# Patient Record
(demographics unavailable — no encounter records)

---

## 2025-01-06 NOTE — IMAGING
[de-identified] : LEFT SHOULDER Inspection: No swelling.  Palpation: Tenderness is noted at the bicipital groove, anterior and lateral.  Range of motion: There is pain with range of motion. , ER 55, @90ER 90, @90IR 30 Strength: There is pain, weakness, and discomfort with strength testing. Forward Flexion 3/5. Abduction 3/5. External Rotation 4/5 and Internal Rotation 5-/5  Neurological testings: motor and sensor intact distally. Ligament Stability and Special Tests:  There is positive arc of pain.  Shoulder apprehension: neg Shoulder relocation: neg Obriens test: pos Biceps Active test: neg Wren Labral Shear: neg Impingement testing: pos Sherwin testing: pos Whipple: pos Cross Body Adduction: neg

## 2025-01-06 NOTE — DISCUSSION/SUMMARY
[de-identified] : Assessment:   The patient is a 47 year old male with physical exam findings consistent with LEFT SHOULDER IMPINGEMENT, BICEPS TENDONITIS, RTC SYNDROME   - We discussed their diagnosis and treatment options at length including the risks and benefits of both surgical and non-surgical options. - We will continue conservative treatment with PT, HEP, icing, and OTC anti-inflammatory medication. - The patient was advised to let pain guide the gradual advancement of activities. - Patient is continuing to have worsened pain and weakness. MRI referral eval for RTC tear.  Follow up: with MRI

## 2025-01-06 NOTE — HISTORY OF PRESENT ILLNESS
[de-identified] : 01/06/2025: Pt here today for follow-up for left shoulder pain. Pain and symptoms are the same. Since last visit pt has been doing at home physical therapy with some improvement.   09/30/2024: The patient is a 47 year old M, RHD who presents today complaining of left shoulder. Date of Injury/Onset: Beginning of August 2024 Pain:    At Rest:  4/10 With Activity:   7-8/10 Mechanism of injury: Patient reports carrying furniture from his house and felt a strain.  Quality of symptoms: Aching and sharp  Improves with: Motrin Worse with:  Lifiting Prior treatment/ None School/Sport/Position/Occupation:   Additional Information: [None]

## 2025-01-27 NOTE — DATA REVIEWED
[MRI] : MRI [Left] : left [Shoulder] : shoulder [Report was reviewed and noted in the chart] : The report was reviewed and noted in the chart [I independently reviewed and interpreted images and report] : I independently reviewed and interpreted images and report [I reviewed the films/CD] : I reviewed the films/CD [[email or web address]] : [facility] MRI 01/10/25 Left Shoulder:  1. Rotator cuff tendinitis, no tear.  2. SLAP tear extending posteriorly.

## 2025-01-27 NOTE — IMAGING
[de-identified] : LEFT SHOULDER  Inspection: No swelling.  Palpation: Tenderness is noted at the anterior shoulder.  Range of motion: Full range of motion but with some pain. Strength: There is pain and discomfort with strength testing.  Neurological testing: motor and sensor intact distally. Ligament Stability and Special Tests:  Shoulder apprehension: pos Shoulder relocation: pos Obriens test: pos Biceps Active test: pos Wren Labral Shear: pos Impingement testing: neg Sherwin testing: pain Whipple: neg Cross Body Adduction: neg

## 2025-01-27 NOTE — DISCUSSION/SUMMARY
[de-identified] : Assessment:   The patient is a 47 year old male with physical exam findings consistent with LEFT SHOULDER SLAP TEAR, and BICEPS TENDONITIS   - We discussed their diagnosis and treatment options at length including the risks and benefits of both surgical and non-surgical options. - We will continue conservative treatment with PT (rx provided), HEP, icing, and OTC anti-inflammatory medication. - The patient was advised to let pain guide the gradual advancement of activities.  Follow up: as needed If pain continues, then CSI

## 2025-01-27 NOTE — HISTORY OF PRESENT ILLNESS
[de-identified] : 01/27/2025: Patient present today for follow-up left shoulder. Underwent MRI and is here to discuss the imaging results. Since last visit, pt has been feeling about the same.  01/06/2025: Pt here today for follow-up for left shoulder pain. Pain and symptoms are the same. Since last visit pt has been doing at home physical therapy with some improvement.   09/30/2024: The patient is a 47 year old M, RHD who presents today complaining of left shoulder. Date of Injury/Onset: Beginning of August 2024 Pain:    At Rest:  4/10 With Activity:   7-8/10 Mechanism of injury: Patient reports carrying furniture from his house and felt a strain.  Quality of symptoms: Aching and sharp  Improves with: Motrin Worse with:  Lifiting Prior treatment/ None School/Sport/Position/Occupation:   Additional Information: [None]